# Patient Record
Sex: FEMALE | Race: WHITE | Employment: UNEMPLOYED | URBAN - METROPOLITAN AREA
[De-identification: names, ages, dates, MRNs, and addresses within clinical notes are randomized per-mention and may not be internally consistent; named-entity substitution may affect disease eponyms.]

---
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
## 2023-11-20 ENCOUNTER — OFFICE VISIT (OUTPATIENT)
Dept: FAMILY MEDICINE CLINIC | Age: 18
End: 2023-11-20
Payer: COMMERCIAL

## 2023-11-20 VITALS
DIASTOLIC BLOOD PRESSURE: 70 MMHG | RESPIRATION RATE: 16 BRPM | SYSTOLIC BLOOD PRESSURE: 130 MMHG | HEART RATE: 100 BPM | WEIGHT: 293 LBS | BODY MASS INDEX: 50.02 KG/M2 | HEIGHT: 64 IN

## 2023-11-20 DIAGNOSIS — Z00.00 WELL ADULT EXAM: Primary | ICD-10-CM

## 2023-11-20 DIAGNOSIS — F33.1 MODERATE EPISODE OF RECURRENT MAJOR DEPRESSIVE DISORDER (HCC): ICD-10-CM

## 2023-11-20 PROCEDURE — G8484 FLU IMMUNIZE NO ADMIN: HCPCS | Performed by: NURSE PRACTITIONER

## 2023-11-20 PROCEDURE — 99395 PREV VISIT EST AGE 18-39: CPT | Performed by: NURSE PRACTITIONER

## 2023-11-20 RX ORDER — DULOXETIN HYDROCHLORIDE 30 MG/1
30 CAPSULE, DELAYED RELEASE ORAL DAILY
Qty: 30 CAPSULE | Refills: 1 | Status: SHIPPED | OUTPATIENT
Start: 2023-11-20

## 2023-11-20 SDOH — ECONOMIC STABILITY: HOUSING INSECURITY
IN THE LAST 12 MONTHS, WAS THERE A TIME WHEN YOU DID NOT HAVE A STEADY PLACE TO SLEEP OR SLEPT IN A SHELTER (INCLUDING NOW)?: NO

## 2023-11-20 SDOH — ECONOMIC STABILITY: FOOD INSECURITY: WITHIN THE PAST 12 MONTHS, YOU WORRIED THAT YOUR FOOD WOULD RUN OUT BEFORE YOU GOT MONEY TO BUY MORE.: NEVER TRUE

## 2023-11-20 SDOH — ECONOMIC STABILITY: INCOME INSECURITY: HOW HARD IS IT FOR YOU TO PAY FOR THE VERY BASICS LIKE FOOD, HOUSING, MEDICAL CARE, AND HEATING?: NOT HARD AT ALL

## 2023-11-20 SDOH — ECONOMIC STABILITY: FOOD INSECURITY: WITHIN THE PAST 12 MONTHS, THE FOOD YOU BOUGHT JUST DIDN'T LAST AND YOU DIDN'T HAVE MONEY TO GET MORE.: NEVER TRUE

## 2023-11-20 ASSESSMENT — COLUMBIA-SUICIDE SEVERITY RATING SCALE - C-SSRS
6. HAVE YOU EVER DONE ANYTHING, STARTED TO DO ANYTHING, OR PREPARED TO DO ANYTHING TO END YOUR LIFE?: NO
5. HAVE YOU STARTED TO WORK OUT OR WORKED OUT THE DETAILS OF HOW TO KILL YOURSELF? DO YOU INTEND TO CARRY OUT THIS PLAN?: NO
4. HAVE YOU HAD THESE THOUGHTS AND HAD SOME INTENTION OF ACTING ON THEM?: YES
1. WITHIN THE PAST MONTH, HAVE YOU WISHED YOU WERE DEAD OR WISHED YOU COULD GO TO SLEEP AND NOT WAKE UP?: YES
2. HAVE YOU ACTUALLY HAD ANY THOUGHTS OF KILLING YOURSELF?: YES
3. HAVE YOU BEEN THINKING ABOUT HOW YOU MIGHT KILL YOURSELF?: NO

## 2023-11-20 ASSESSMENT — PATIENT HEALTH QUESTIONNAIRE - PHQ9
6. FEELING BAD ABOUT YOURSELF - OR THAT YOU ARE A FAILURE OR HAVE LET YOURSELF OR YOUR FAMILY DOWN: 3
7. TROUBLE CONCENTRATING ON THINGS, SUCH AS READING THE NEWSPAPER OR WATCHING TELEVISION: 3
5. POOR APPETITE OR OVEREATING: 1
2. FEELING DOWN, DEPRESSED OR HOPELESS: 3
SUM OF ALL RESPONSES TO PHQ QUESTIONS 1-9: 23
3. TROUBLE FALLING OR STAYING ASLEEP: 3
SUM OF ALL RESPONSES TO PHQ9 QUESTIONS 1 & 2: 6
SUM OF ALL RESPONSES TO PHQ QUESTIONS 1-9: 22
9. THOUGHTS THAT YOU WOULD BE BETTER OFF DEAD, OR OF HURTING YOURSELF: 1
SUM OF ALL RESPONSES TO PHQ QUESTIONS 1-9: 23
1. LITTLE INTEREST OR PLEASURE IN DOING THINGS: 3
SUM OF ALL RESPONSES TO PHQ QUESTIONS 1-9: 23
8. MOVING OR SPEAKING SO SLOWLY THAT OTHER PEOPLE COULD HAVE NOTICED. OR THE OPPOSITE, BEING SO FIGETY OR RESTLESS THAT YOU HAVE BEEN MOVING AROUND A LOT MORE THAN USUAL: 3
10. IF YOU CHECKED OFF ANY PROBLEMS, HOW DIFFICULT HAVE THESE PROBLEMS MADE IT FOR YOU TO DO YOUR WORK, TAKE CARE OF THINGS AT HOME, OR GET ALONG WITH OTHER PEOPLE: 2
4. FEELING TIRED OR HAVING LITTLE ENERGY: 3

## 2023-11-20 ASSESSMENT — ENCOUNTER SYMPTOMS
NAUSEA: 0
ABDOMINAL PAIN: 0
SHORTNESS OF BREATH: 0
COUGH: 0

## 2023-11-20 NOTE — PATIENT INSTRUCTIONS
[facility] Inpatient Nephrology Consult Note    Reason for Consult:  MORELIA on HD  Requesting Physician:  Dr. Morrell Host    History of Present Ilness:    50 y.o. female who  has a past medical history of Anticoagulant long-term use, Arthritis, Asthma, Baker's cyst, Depression, Factor V Leiden ([address] Factor V Leiden ([address] H/O blood clots, Heartburn, Hyperlipidemia, MRSA bacteremia (09/10/2020), and Osteoarthritis. who presented for PEG tube dislodgement. Patient had DIC with cardiogenic shock in September [facility] malnutrition s/p PEG tube placement per GI. Patient presented to ED for PEG tube dislodgement and was replaced by GI. The patient had an MORELIA and is on HD MWF at [facility] DR ANJEL PATEL, still monitoring for recovery. We are consulted for dialysis dependent MORELIA. Labs were stable, otherwise no reported fever/chills/N/V/D, no issues with HD.       Subjective:  More alert today Able to tell me she is in [place]  Agitated overnight  No family at bedside             Past Medical History:   Diagnosis Date    Anticoagulant long-term use     Arthritis     Asthma     Baker's cyst     Depression     Factor V Leiden ([address]     Factor V Leiden ([address]     H/O blood clots     Heartburn     Hyperlipidemia     MRSA bacteremia 09/10/2020    Osteoarthritis              Past Surgical History:   Procedure Laterality Date    GASTROSTOMY TUBE PLACEMENT N/A 9/25/2020    ESOPHAGOGASTRODUODENOSCOPY PERCUTANEOUS ENDOSCOPIC GASTROSTOMY TUBE PLACEMENT performed by Antwan Wilde MD at [facility] N/A 10/9/2020    ESOPHAGOGASTRODUODENOSCOPY PERCUTANEOUS ENDOCOPIC GASTROSTOMY TUBE PLACEMENT performed by Vu Cedeno DO at [facility]  IR TUNNELED CATHETER PLACEMENT GREATER THAN 5 YEARS  9/29/2020    IR TUNNELED CATHETER PLACEMENT GREATER THAN 5 YEARS 9/29/2020 [facility]    TUNNELED VENOUS CATHETER PLACEMENT Right 09/29/2020 You may receive a survey regarding the care you received during your visit. Your input is valuable to us. We encourage you to complete and return your survey. We hope you will choose us in the future for your healthcare needs. Michelet ; RIJ; 19cm; Dr. Leigh Ann Anaya        Allergies:  Ibuprofen      Social History     Occupational History    Not on file   Tobacco Use    Smoking status: Former Smoker     Packs/day: 0.50     Types: Cigarettes     Last attempt to quit: 2020     Years since quittin.1    Smokeless tobacco: Never Used   Substance and Sexual Activity    Alcohol use: No     Alcohol/week: 0.0 standard drinks    Drug use: Yes     Frequency: 14.0 times per week     Types: Marijuana    Sexual activity: Not on file          Family History   Problem Relation Age of Onset    Heart Attack Father          Physical exam:     Vitals:    10/16/20 0816   BP: 133/74   Pulse: 99   Resp: 16   Temp: 98.3 °F (36.8 °C)   SpO2: 93%    Temp (24hrs), Av.1 °F (36.7 °C), Min:97.6 °F (36.4 °C), Max:98.5 °F (36.9 °C)   & BP  Min: 111/78  Max: 144/83 Pulse  Av.8  Min: 80  Max: 101    24HR INTAKE/OUTPUT:      Intake/Output Summary (Last 24 hours) at 10/16/2020 1131  Last data filed at 10/15/2020 1757  Gross per 24 hour   Intake 286 ml   Output 30 ml   Net 256 ml         Gen: Resting in bed, somnolent , answering simple questions  Head: NC , AT  Neck: supple, trachea in midline, no overt thyromegaly noted  CV: S1, S2 heard ,no peripheral edema, no JVD    Lungs: no increased WOB, no abdominal breathing pattern, +b/l air entry, no crackles  Abd: Soft, not tender , not distended , no guarding PEG in place   Skin: no rash/lesions on exposed skin of head and neck   R TDC in place    Database  Lab Results   Component Value Date    WBC 12.1 (H) 10/15/2020    HGB 8.6 (L) 10/15/2020    HCT 27.0 (L) 10/15/2020    MCV 86.0 10/15/2020     10/15/2020           No results found for: IRON, TIBC, FERRITIN    Lab Results   Component Value Date     10/16/2020    K 4.0 10/16/2020    K 4.3 10/08/2020    CL 99 10/16/2020    CO2 29 10/16/2020    BUN 27 10/16/2020    CREATININE 3.4 10/16/2020    GLUCOSE 112 10/16/2020    CALCIUM 10.2 10/16/2020 Lab Results   Component Value Date    PTH <1.2 (L) 10/02/2020    CALCIUM 10.2 10/16/2020    CAION 1.11 (L) 09/15/2020    PHOS 4.8 10/04/2020       Lab Results   Component Value Date    LABALBU 3.2 (L) 10/04/2020       Labs Renal Latest Ref Rng & Units 10/16/2020 10/15/2020 10/14/2020 10/13/2020 10/12/2020   BUN 7 - 20 mg/dL 27(H) 14 26(H) 18 24(H)   Cr 0.6 - 1.1 mg/dL 3.4(H) 2. 7(H) 3. 0(H) 2. 5(H) 3. 1(H)   K 3.5 - 5.1 mmol/L 4.0 3.9 3.8 4.2 3.9   Na 136 - 145 mmol/L 139 139 140 135(L) 136       Lab Results   Component Value Date    MG 2.10 10/04/2020       UA   Lab Results   Component Value Date    SPECGRAV 1.025 10/15/2020    BLOODU SMALL 10/15/2020    LEUKOCYTESUR MODERATE 10/15/2020    WBCUA 10-20 10/15/2020    RBCUA 3-4 10/15/2020       RADIOLOGY     Lab Results   Component Value Date    LVEF 33 09/11/2020       Lab Results   Component Value Date    CREATININE 3.4 (H) 10/16/2020    CREATININE 2.7 (H) 10/15/2020    CREATININE 3.0 (H) 10/14/2020    CREATININE 2.5 (H) 10/13/2020    CREATININE 3.1 (H) 10/12/2020       Wt Readings from Last 3 Encounters:   10/16/20 181 lb 14.1 oz (82.5 kg)   10/04/20 188 lb 0.8 oz (85.3 kg)   12/17/18 229 lb (103.9 kg)       BP Readings from Last 3 Encounters:   10/16/20 133/74   10/09/20 (!) 141/69   10/04/20 139/71         Lab Results   Component Value Date    CALCIUM 10.2 10/16/2020    PHOS 4.8 10/04/2020         Lab Results   Component Value Date    LABALBU 3.2 (L) 10/04/2020         Scheduled Meds:   warfarin  3.75 mg Oral Once    cefTRIAXone (ROCEPHIN) IV  1 g Intravenous Q24H    famotidine (PEPCID) injection  20 mg Intravenous Daily    carvedilol  12.5 mg Oral BID WC    levETIRAcetam  1,000 mg Oral BID    diazePAM  5 mg Oral Q12H    sevelamer  0.8 g Per G Tube Daily    sodium chloride flush  10 mL Intravenous 2 times per day    amiodarone  200 mg Per NG tube BID    atorvastatin  10 mg Oral Daily    b complex-C-folic acid  1 capsule Oral Daily    lacosamide  200 mg Oral BID    warfarin (COUMADIN) daily dosing (placeholder)   Other RX Placeholder     Continuous Infusions:    PRN Meds:. Loperamide HCl, heparin (porcine), albumin human, diphenhydrAMINE, sodium chloride flush, acetaminophen **OR** acetaminophen, promethazine **OR** ondansetron, albuterol sulfate HFA        Assessment & Plan:    - Dialysis dependent MORELIA  - likely secondary to ischemia ATN. She had additional exposure to radiocontrast              - ZENON is neg, ANCA neg, C3 is low at 54  - Initiated on IHD on 9/11/20. Transitioned to CRRT on 9/12/20 which was stopped 9/15/20.   Restarted HD 09/17/20  - outpatient dialysis at [facility] MWF @ 5 PM  - [facility] [place] by [facility] placed by IR 09/29/20     PLAN:  HD today  Monitoring for possible recovery still - no signs of recovery so far      - hx of PEA arrest 9/11/20    - Essential HTN: controlled     - encephalopathy:  confused off and on, more responsive today    - Hx of Factor V leiden deficiency with PE/coagulopathy/DIC:   On coumadin    - hyperphosphatemia : on sevelamer    - anemia :  On CHAVEZ     Nutrition Tolerating LEILA Max

## 2023-11-20 NOTE — PROGRESS NOTES
Forgot to give pt lab orders at her appt today. Called pt, she will go to the [address] [place] [address] lab and have them pull orders from [address] [place] [address] 15. Pt is aware 12hr fasting is required.
for dizziness, light-headedness and headaches. Psychiatric/Behavioral:  Positive for decreased concentration, dysphoric mood and sleep disturbance. Objective:   Physical Exam  Constitutional:       General: She is not in acute distress. Eyes:      Pupils: Pupils are equal, round, and reactive to light. Cardiovascular:      Rate and Rhythm: Normal rate and regular rhythm. Heart sounds: No murmur heard. Pulmonary:      Effort: Pulmonary effort is normal.      Breath sounds: Normal breath sounds. No wheezing. Abdominal:      General: Bowel sounds are normal. There is no distension. Palpations: Abdomen is soft. Tenderness: There is no abdominal tenderness. Musculoskeletal:         General: No tenderness. Normal range of motion. Cervical back: Normal range of motion and neck supple. Skin:     General: Skin is warm and dry. Findings: No rash. Psychiatric:         Mood and Affect: Mood is depressed. Behavior: Behavior is slowed and withdrawn. Assessment:       Diagnosis Orders   1. Well adult exam  CBC    Lipid Panel    Comprehensive Metabolic Panel    Hemoglobin A1C    TSH with Reflex      2.  Moderate episode of recurrent major depressive disorder (HCC)  DULoxetine (CYMBALTA) 30 MG extended release capsule              Plan:      Screening Labs  Counseling discussed  Cymbalta 30 mg Daily  NON-pharm tx discussed  RTO in 1 month    Current Outpatient Medications   Medication Sig Dispense Refill    DULoxetine (CYMBALTA) 30 MG extended release capsule Take 1 capsule by mouth daily 30 capsule 1    albuterol sulfate  (90 Base) MCG/ACT inhaler Inhale 2 puffs into the lungs every 6 hours as needed for Wheezing or Shortness of Breath Rescue inhaler 1 each 1    naproxen (NAPROSYN) 500 MG tablet Take 1 tablet by mouth 2 times daily for 5 days 10 tablet 0    SUMAtriptan (IMITREX) 50 MG tablet Take 1 tablet by mouth once as needed for Migraine Repeat 1 tab in 2

## 2023-11-21 ENCOUNTER — NURSE ONLY (OUTPATIENT)
Dept: LAB | Age: 18
End: 2023-11-21

## 2023-11-21 DIAGNOSIS — Z00.00 WELL ADULT EXAM: ICD-10-CM

## 2023-11-21 LAB
ALBUMIN SERPL BCG-MCNC: 4 G/DL (ref 3.5–5.1)
ALP SERPL-CCNC: 52 U/L (ref 38–126)
ALT SERPL W/O P-5'-P-CCNC: 13 U/L (ref 11–66)
ANION GAP SERPL CALC-SCNC: 12 MEQ/L (ref 8–16)
AST SERPL-CCNC: 13 U/L (ref 5–40)
BILIRUB SERPL-MCNC: 0.3 MG/DL (ref 0.3–1.2)
BUN SERPL-MCNC: 9 MG/DL (ref 7–22)
CALCIUM SERPL-MCNC: 9.1 MG/DL (ref 8.5–10.5)
CHLORIDE SERPL-SCNC: 104 MEQ/L (ref 98–111)
CHOLEST SERPL-MCNC: 132 MG/DL (ref 100–199)
CO2 SERPL-SCNC: 24 MEQ/L (ref 23–33)
CREAT SERPL-MCNC: 0.6 MG/DL (ref 0.4–1.2)
DEPRECATED MEAN GLUCOSE BLD GHB EST-ACNC: 102 MG/DL (ref 70–126)
DEPRECATED RDW RBC AUTO: 45.1 FL (ref 35–45)
ERYTHROCYTE [DISTWIDTH] IN BLOOD BY AUTOMATED COUNT: 14.3 % (ref 11.5–14.5)
GFR SERPL CREATININE-BSD FRML MDRD: > 60 ML/MIN/1.73M2
GLUCOSE SERPL-MCNC: 89 MG/DL (ref 70–108)
HBA1C MFR BLD HPLC: 5.4 % (ref 4.4–6.4)
HCT VFR BLD AUTO: 39.1 % (ref 37–47)
HDLC SERPL-MCNC: 47 MG/DL
HGB BLD-MCNC: 12.2 GM/DL (ref 12–16)
LDLC SERPL CALC-MCNC: 73 MG/DL
MCH RBC QN AUTO: 27.1 PG (ref 26–33)
MCHC RBC AUTO-ENTMCNC: 31.2 GM/DL (ref 32.2–35.5)
MCV RBC AUTO: 86.9 FL (ref 81–99)
PLATELET # BLD AUTO: 286 THOU/MM3 (ref 130–400)
PMV BLD AUTO: 11 FL (ref 9.4–12.4)
POTASSIUM SERPL-SCNC: 4.2 MEQ/L (ref 3.5–5.2)
PROT SERPL-MCNC: 6.9 G/DL (ref 6.1–8)
RBC # BLD AUTO: 4.5 MILL/MM3 (ref 4.2–5.4)
SODIUM SERPL-SCNC: 140 MEQ/L (ref 135–145)
TRIGL SERPL-MCNC: 59 MG/DL (ref 0–199)
TSH SERPL DL<=0.005 MIU/L-ACNC: 2.25 UIU/ML (ref 0.4–4.2)
WBC # BLD AUTO: 7.7 THOU/MM3 (ref 4.8–10.8)

## 2024-01-22 ENCOUNTER — OFFICE VISIT (OUTPATIENT)
Dept: FAMILY MEDICINE CLINIC | Age: 19
End: 2024-01-22
Payer: COMMERCIAL

## 2024-01-22 VITALS
BODY MASS INDEX: 53.25 KG/M2 | RESPIRATION RATE: 16 BRPM | HEART RATE: 76 BPM | DIASTOLIC BLOOD PRESSURE: 64 MMHG | SYSTOLIC BLOOD PRESSURE: 102 MMHG | WEIGHT: 293 LBS

## 2024-01-22 DIAGNOSIS — F33.1 MODERATE EPISODE OF RECURRENT MAJOR DEPRESSIVE DISORDER (HCC): Primary | ICD-10-CM

## 2024-01-22 DIAGNOSIS — R06.02 SOB (SHORTNESS OF BREATH): ICD-10-CM

## 2024-01-22 PROCEDURE — 1036F TOBACCO NON-USER: CPT | Performed by: NURSE PRACTITIONER

## 2024-01-22 PROCEDURE — G8484 FLU IMMUNIZE NO ADMIN: HCPCS | Performed by: NURSE PRACTITIONER

## 2024-01-22 PROCEDURE — 99213 OFFICE O/P EST LOW 20 MIN: CPT | Performed by: NURSE PRACTITIONER

## 2024-01-22 PROCEDURE — G8427 DOCREV CUR MEDS BY ELIG CLIN: HCPCS | Performed by: NURSE PRACTITIONER

## 2024-01-22 PROCEDURE — G8417 CALC BMI ABV UP PARAM F/U: HCPCS | Performed by: NURSE PRACTITIONER

## 2024-01-22 RX ORDER — ALBUTEROL SULFATE 90 UG/1
2 AEROSOL, METERED RESPIRATORY (INHALATION) EVERY 6 HOURS PRN
Qty: 1 EACH | Refills: 1 | Status: SHIPPED | OUTPATIENT
Start: 2024-01-22

## 2024-01-22 RX ORDER — DULOXETIN HYDROCHLORIDE 60 MG/1
60 CAPSULE, DELAYED RELEASE ORAL DAILY
Qty: 90 CAPSULE | Refills: 3 | Status: SHIPPED | OUTPATIENT
Start: 2024-01-22

## 2024-01-22 RX ORDER — BUPROPION HYDROCHLORIDE 300 MG/1
300 TABLET ORAL EVERY MORNING
Qty: 90 TABLET | Refills: 0 | Status: SHIPPED | OUTPATIENT
Start: 2024-01-22

## 2024-01-22 ASSESSMENT — ENCOUNTER SYMPTOMS
SHORTNESS OF BREATH: 0
COUGH: 0
ABDOMINAL PAIN: 0
NAUSEA: 0

## 2024-01-22 ASSESSMENT — PATIENT HEALTH QUESTIONNAIRE - PHQ9
1. LITTLE INTEREST OR PLEASURE IN DOING THINGS: 2
6. FEELING BAD ABOUT YOURSELF - OR THAT YOU ARE A FAILURE OR HAVE LET YOURSELF OR YOUR FAMILY DOWN: 2
2. FEELING DOWN, DEPRESSED OR HOPELESS: 1
8. MOVING OR SPEAKING SO SLOWLY THAT OTHER PEOPLE COULD HAVE NOTICED. OR THE OPPOSITE, BEING SO FIGETY OR RESTLESS THAT YOU HAVE BEEN MOVING AROUND A LOT MORE THAN USUAL: 2
SUM OF ALL RESPONSES TO PHQ QUESTIONS 1-9: 14
4. FEELING TIRED OR HAVING LITTLE ENERGY: 1
7. TROUBLE CONCENTRATING ON THINGS, SUCH AS READING THE NEWSPAPER OR WATCHING TELEVISION: 2
10. IF YOU CHECKED OFF ANY PROBLEMS, HOW DIFFICULT HAVE THESE PROBLEMS MADE IT FOR YOU TO DO YOUR WORK, TAKE CARE OF THINGS AT HOME, OR GET ALONG WITH OTHER PEOPLE: 0
SUM OF ALL RESPONSES TO PHQ QUESTIONS 1-9: 14
SUM OF ALL RESPONSES TO PHQ QUESTIONS 1-9: 14
SUM OF ALL RESPONSES TO PHQ9 QUESTIONS 1 & 2: 3
9. THOUGHTS THAT YOU WOULD BE BETTER OFF DEAD, OR OF HURTING YOURSELF: 0
3. TROUBLE FALLING OR STAYING ASLEEP: 2
SUM OF ALL RESPONSES TO PHQ QUESTIONS 1-9: 14
5. POOR APPETITE OR OVEREATING: 2

## 2024-01-22 NOTE — PROGRESS NOTES
Subjective:      Patient ID: Suki Sevilla 2005 is a 18 y.o. female here for evaluation.     Chief Complaint   Patient presents with    1 Month Follow-Up    Depression     Improved mood since last visit       Last visit 1 month ago increase on Cymbalta 60 mg and addition of Wellbutrin 150 mg XL.  Tolerating it well.   Benefit seen.  Improved mood.   Motivation has improved the most.   Running a 5K in February 2024 in [place]    Last couple of years with depression.   Has continued and worsening.  Support is off and on.  Concentration affected.     Working FT At [employer]    Sleep affected  Interest decline  Guilty  Energy decline  Contraction  Appetite change  Psychomotor Retardation    Hx of suicidal thought.  Nothing recent.    Patient Active Problem List   Diagnosis    Enlarged tonsils    Chronic serous otitis media    Childhood obesity, BMI  percentile    Fever in pediatric patient    Headache    Generalized abdominal pain    Hyperinsulinemia    Sprain of right ankle       Vitals:    01/22/24 1319   BP: 102/64   Pulse: 76   Resp: 16        Labs reviewed    Lab Results   Component Value Date    LABA1C 5.4 11/21/2023    LABA1C 5.7 07/12/2016     No results found for: \"EAG\"    No components found for: \"CHLPL\"  Lab Results   Component Value Date    TRIG 59 11/21/2023     Lab Results   Component Value Date    HDL 47 11/21/2023     Lab Results   Component Value Date    LDLCALC 73 11/21/2023     No results found for: \"LABVLDL\"      Chemistry        Component Value Date/Time     11/21/2023 0947    K 4.2 11/21/2023 0947     11/21/2023 0947    CO2 24 11/21/2023 0947    BUN 9 11/21/2023 0947    CREATININE 0.6 11/21/2023 0947        Component Value Date/Time    CALCIUM 9.1 11/21/2023 0947    ALKPHOS 52 11/21/2023 0947    AST 13 11/21/2023 0947    ALT 13 11/21/2023 0947    BILITOT 0.3 11/21/2023 0947            Lab Results   Component Value Date    TSH 2.250 11/21/2023       Lab Results   Component

## 2024-02-09 ENCOUNTER — OFFICE VISIT (OUTPATIENT)
Dept: FAMILY MEDICINE CLINIC | Age: 19
End: 2024-02-09
Payer: COMMERCIAL

## 2024-02-09 VITALS
WEIGHT: 293 LBS | HEIGHT: 64 IN | BODY MASS INDEX: 50.02 KG/M2 | DIASTOLIC BLOOD PRESSURE: 74 MMHG | OXYGEN SATURATION: 99 % | RESPIRATION RATE: 16 BRPM | HEART RATE: 85 BPM | SYSTOLIC BLOOD PRESSURE: 122 MMHG

## 2024-02-09 DIAGNOSIS — S86.811A STRAIN OF RIGHT CALF MUSCLE: Primary | ICD-10-CM

## 2024-02-09 PROCEDURE — 99213 OFFICE O/P EST LOW 20 MIN: CPT | Performed by: NURSE PRACTITIONER

## 2024-02-09 PROCEDURE — G8427 DOCREV CUR MEDS BY ELIG CLIN: HCPCS | Performed by: NURSE PRACTITIONER

## 2024-02-09 PROCEDURE — G8484 FLU IMMUNIZE NO ADMIN: HCPCS | Performed by: NURSE PRACTITIONER

## 2024-02-09 PROCEDURE — G8417 CALC BMI ABV UP PARAM F/U: HCPCS | Performed by: NURSE PRACTITIONER

## 2024-02-09 PROCEDURE — 1036F TOBACCO NON-USER: CPT | Performed by: NURSE PRACTITIONER

## 2024-02-09 RX ORDER — NAPROXEN 500 MG/1
500 TABLET ORAL 2 TIMES DAILY PRN
Qty: 30 TABLET | Refills: 0 | Status: SHIPPED | OUTPATIENT
Start: 2024-02-09

## 2024-02-09 ASSESSMENT — ENCOUNTER SYMPTOMS: RESPIRATORY NEGATIVE: 1

## 2024-04-26 ENCOUNTER — OFFICE VISIT (OUTPATIENT)
Dept: FAMILY MEDICINE CLINIC | Age: 19
End: 2024-04-26
Payer: COMMERCIAL

## 2024-04-26 VITALS
DIASTOLIC BLOOD PRESSURE: 76 MMHG | RESPIRATION RATE: 16 BRPM | WEIGHT: 293 LBS | SYSTOLIC BLOOD PRESSURE: 124 MMHG | BODY MASS INDEX: 52.68 KG/M2 | HEART RATE: 80 BPM

## 2024-04-26 DIAGNOSIS — G44.52 NEW DAILY PERSISTENT HEADACHE: ICD-10-CM

## 2024-04-26 DIAGNOSIS — F33.1 MODERATE EPISODE OF RECURRENT MAJOR DEPRESSIVE DISORDER (HCC): Primary | ICD-10-CM

## 2024-04-26 DIAGNOSIS — H53.9 VISUAL CHANGES: ICD-10-CM

## 2024-04-26 DIAGNOSIS — H57.02 PUPILLARY SIZE INEQUALITY: ICD-10-CM

## 2024-04-26 PROCEDURE — 1036F TOBACCO NON-USER: CPT | Performed by: NURSE PRACTITIONER

## 2024-04-26 PROCEDURE — G8427 DOCREV CUR MEDS BY ELIG CLIN: HCPCS | Performed by: NURSE PRACTITIONER

## 2024-04-26 PROCEDURE — G8417 CALC BMI ABV UP PARAM F/U: HCPCS | Performed by: NURSE PRACTITIONER

## 2024-04-26 PROCEDURE — 99214 OFFICE O/P EST MOD 30 MIN: CPT | Performed by: NURSE PRACTITIONER

## 2024-04-26 PROCEDURE — G2211 COMPLEX E/M VISIT ADD ON: HCPCS | Performed by: NURSE PRACTITIONER

## 2024-04-26 RX ORDER — NAPROXEN 500 MG/1
500 TABLET ORAL 2 TIMES DAILY PRN
Qty: 28 TABLET | Refills: 0 | Status: SHIPPED | OUTPATIENT
Start: 2024-04-26 | End: 2024-05-10

## 2024-04-26 RX ORDER — AMITRIPTYLINE HYDROCHLORIDE 25 MG/1
25 TABLET, FILM COATED ORAL NIGHTLY
Qty: 14 TABLET | Refills: 0 | Status: SHIPPED | OUTPATIENT
Start: 2024-04-26 | End: 2024-05-10

## 2024-04-26 RX ORDER — BUPROPION HYDROCHLORIDE 300 MG/1
300 TABLET ORAL EVERY MORNING
Qty: 90 TABLET | Refills: 3 | Status: SHIPPED | OUTPATIENT
Start: 2024-04-26

## 2024-04-26 RX ORDER — DULOXETIN HYDROCHLORIDE 60 MG/1
60 CAPSULE, DELAYED RELEASE ORAL DAILY
Qty: 90 CAPSULE | Refills: 3 | Status: SHIPPED | OUTPATIENT
Start: 2024-04-26

## 2024-04-26 ASSESSMENT — ENCOUNTER SYMPTOMS
ABDOMINAL PAIN: 0
SHORTNESS OF BREATH: 0
NAUSEA: 0
COUGH: 0

## 2024-04-26 NOTE — PROGRESS NOTES
Subjective:      Patient ID: Suki Sevilla 2005 is a 18 y.o. female here for evaluation.     Chief Complaint   Patient presents with    3 Month Follow-Up     Mood improved and stable    Headache     1-2x daily for the past 1 or 2 months.  Tylenol or Ibuprofen with mild relief       On Cymbalta 60 mg and Wellbutrin 300 mg XL.  Tolerating it well.   Benefit seen.  Improved mood.   Motivation has improved the most.   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  Working FT at [employer] and going to School PT.     Daily HA.  Onset of 2 months.  NKI.   1-2 times daily.  Worse as the day goes on.  Taking Tylenol and IBU everyday.  HA are intense with right visual changes.   Concentration and confusion episodes.  Dazed at times.  Denies worse HA of life or waking up from sleep.     Body mass index is 52.68 kg/m².    Patient Active Problem List   Diagnosis    Enlarged tonsils    Chronic serous otitis media    Childhood obesity, BMI  percentile    Fever in pediatric patient    Headache    Generalized abdominal pain    Hyperinsulinemia    Sprain of right ankle       Vitals:    04/26/24 0928   BP: 124/76   Pulse: 80   Resp: 16        Labs reviewed    Lab Results   Component Value Date    LABA1C 5.4 11/21/2023    LABA1C 5.7 07/12/2016     Lab Results   Component Value Date     11/21/2023       No components found for: \"CHLPL\"  Lab Results   Component Value Date    TRIG 59 11/21/2023     Lab Results   Component Value Date    HDL 47 11/21/2023     Lab Results   Component Value Date    LDLCALC 73 11/21/2023     No components found for: \"LABVLDL\"      Chemistry        Component Value Date/Time     11/21/2023 0947    K 4.2 11/21/2023 0947     11/21/2023 0947    CO2 24 11/21/2023 0947    BUN 9 11/21/2023 0947    CREATININE 0.6 11/21/2023 0947        Component Value Date/Time    CALCIUM 9.1 11/21/2023 0947    ALKPHOS 52 11/21/2023 0947    AST 13 11/21/2023 0947    ALT 13 11/21/2023 0947    BILITOT 0.3

## 2024-05-30 ENCOUNTER — HOSPITAL ENCOUNTER (OUTPATIENT)
Dept: MRI IMAGING | Age: 19
Discharge: HOME | End: 2024-05-30
Payer: COMMERCIAL

## 2024-05-30 DIAGNOSIS — G44.52 NEW DAILY PERSISTENT HEADACHE: ICD-10-CM

## 2024-05-30 DIAGNOSIS — H57.02 PUPILLARY SIZE INEQUALITY: ICD-10-CM

## 2024-05-30 DIAGNOSIS — H53.9 VISUAL CHANGES: ICD-10-CM

## 2024-05-30 PROCEDURE — 6360000004 HC RX CONTRAST MEDICATION: Performed by: NURSE PRACTITIONER

## 2024-05-30 PROCEDURE — 70553 MRI BRAIN STEM W/O & W/DYE: CPT

## 2024-05-30 PROCEDURE — A9579 GAD-BASE MR CONTRAST NOS,1ML: HCPCS | Performed by: NURSE PRACTITIONER

## 2024-05-30 RX ADMIN — GADOTERIDOL 20 ML: 279.3 INJECTION, SOLUTION INTRAVENOUS at 20:50

## 2024-05-31 DIAGNOSIS — R09.89 SINUS COMPLAINT: ICD-10-CM

## 2024-05-31 DIAGNOSIS — J34.1 MUCOUS RETENTION CYST OF MAXILLARY SINUS: Primary | ICD-10-CM

## 2024-07-19 ENCOUNTER — HOSPITAL ENCOUNTER (OUTPATIENT)
Dept: GENERAL RADIOLOGY | Age: 19
Discharge: HOME | End: 2024-07-19
Payer: COMMERCIAL

## 2024-07-19 ENCOUNTER — HOSPITAL ENCOUNTER (OUTPATIENT)
Age: 19
Discharge: HOME | End: 2024-07-19
Payer: COMMERCIAL

## 2024-07-19 ENCOUNTER — PATIENT MESSAGE (OUTPATIENT)
Dept: FAMILY MEDICINE CLINIC | Age: 19
End: 2024-07-19

## 2024-07-19 DIAGNOSIS — M25.472 PAIN AND SWELLING OF LEFT ANKLE: Primary | ICD-10-CM

## 2024-07-19 DIAGNOSIS — M25.472 PAIN AND SWELLING OF LEFT ANKLE: ICD-10-CM

## 2024-07-19 DIAGNOSIS — M25.572 PAIN AND SWELLING OF LEFT ANKLE: ICD-10-CM

## 2024-07-19 DIAGNOSIS — M25.572 PAIN AND SWELLING OF LEFT ANKLE: Primary | ICD-10-CM

## 2024-07-19 PROCEDURE — 73610 X-RAY EXAM OF ANKLE: CPT

## 2024-07-19 NOTE — TELEPHONE ENCOUNTER
From: Suki Sevilla  To: Jose Chavez  Sent: 7/19/2024 7:09 AM EDT  Subject: Left ankle injury    I injured my left ankle on Monday and through out the week it has gotten worse with swelling, a little bruising and hurts to put pressure on. I was wanting to see if I can get an X-ray order send it for it.

## 2025-03-10 ENCOUNTER — TELEMEDICINE (OUTPATIENT)
Dept: FAMILY MEDICINE CLINIC | Age: 20
End: 2025-03-10
Payer: COMMERCIAL

## 2025-03-10 DIAGNOSIS — R25.1 SHAKINESS: Primary | ICD-10-CM

## 2025-03-10 PROCEDURE — G8427 DOCREV CUR MEDS BY ELIG CLIN: HCPCS | Performed by: NURSE PRACTITIONER

## 2025-03-10 PROCEDURE — 99213 OFFICE O/P EST LOW 20 MIN: CPT | Performed by: NURSE PRACTITIONER

## 2025-03-10 PROCEDURE — G2211 COMPLEX E/M VISIT ADD ON: HCPCS | Performed by: NURSE PRACTITIONER

## 2025-03-10 NOTE — PROGRESS NOTES
Subjective:      Patient ID: Suki Sevilla is a 19 y.o. female    HPI: Acute     TELEHEALTH EVALUATION -- Audio/Visual     Patient identification was verified at the start of the visit: Yes    Services were provided through a video synchronous discussion virtually to substitute for in-person clinic visit. Patient and provider were located at their individual homes.    Patient has requested an audio/video evaluation for the following concern(s):    Chief Complaint   Patient presents with    Not Feeling Well       Last 1 month will have episodes of 2 times per week of getting nausea, shakiness, HA and confusion.    Had an episode yesterday.    BS check during symptoms 88.    Will last 30 minutes to 1 hour.   Will be wiped energy wise after the fact.  No pattern to episodes.  Not associated with eating schedule.    Denies lightheadedness or syncope.     Increase stress with new job and responsibilities.     Patient Active Problem List   Diagnosis    Enlarged tonsils    Chronic serous otitis media    Childhood obesity, BMI  percentile    Fever in pediatric patient    Headache    Generalized abdominal pain    Hyperinsulinemia    Sprain of right ankle       Review of Systems   Constitutional:  Positive for fatigue. Negative for chills and fever.   HENT: Negative.     Respiratory:  Negative for cough and shortness of breath.    Cardiovascular:  Negative for chest pain.   Gastrointestinal:  Negative for abdominal pain and nausea.   Skin:  Negative for rash.   Neurological:  Positive for dizziness and weakness. Negative for light-headedness and headaches.   Psychiatric/Behavioral: Negative.         Objective:   Physical Exam  Constitutional:       General: She is not in acute distress.     Appearance: She is not ill-appearing.   Pulmonary:      Effort: Pulmonary effort is normal. No respiratory distress.   Neurological:      Mental Status: She is alert and oriented to person, place, and time.   Psychiatric:

## 2025-03-11 ENCOUNTER — LAB (OUTPATIENT)
Dept: LAB | Age: 20
End: 2025-03-11

## 2025-03-11 DIAGNOSIS — R25.1 SHAKINESS: ICD-10-CM

## 2025-03-11 LAB
ALBUMIN SERPL BCG-MCNC: 4.1 G/DL (ref 3.4–4.9)
ALP SERPL-CCNC: 55 U/L (ref 35–104)
ALT SERPL W/O P-5'-P-CCNC: 16 U/L (ref 10–35)
ANION GAP SERPL CALC-SCNC: 11 MEQ/L (ref 8–16)
AST SERPL-CCNC: 18 U/L (ref 10–35)
BASOPHILS ABSOLUTE: 0 THOU/MM3 (ref 0–0.1)
BASOPHILS NFR BLD AUTO: 0.5 %
BILIRUB SERPL-MCNC: 0.4 MG/DL (ref 0.3–1.2)
BUN SERPL-MCNC: 12 MG/DL (ref 8–23)
CALCIUM SERPL-MCNC: 9.6 MG/DL (ref 8.6–10)
CHLORIDE SERPL-SCNC: 107 MEQ/L (ref 98–111)
CO2 SERPL-SCNC: 24 MEQ/L (ref 22–29)
CREAT SERPL-MCNC: 0.7 MG/DL (ref 0.5–0.9)
DEPRECATED RDW RBC AUTO: 43.4 FL (ref 35–45)
EOSINOPHIL NFR BLD AUTO: 0.7 %
EOSINOPHILS ABSOLUTE: 0.1 THOU/MM3 (ref 0–0.4)
ERYTHROCYTE [DISTWIDTH] IN BLOOD BY AUTOMATED COUNT: 14.3 % (ref 11.5–14.5)
GFR SERPL CREATININE-BSD FRML MDRD: > 90 ML/MIN/1.73M2
GLUCOSE FASTING: 92 MG/DL (ref 74–109)
HCT VFR BLD AUTO: 40.9 % (ref 37–47)
HGB BLD-MCNC: 12.7 GM/DL (ref 12–16)
IMM GRANULOCYTES # BLD AUTO: 0.02 THOU/MM3 (ref 0–0.07)
IMM GRANULOCYTES NFR BLD AUTO: 0.2 %
INSULIN SERPL-ACNC: 59.4 MU/L
LYMPHOCYTES ABSOLUTE: 2.3 THOU/MM3 (ref 1–4.8)
LYMPHOCYTES NFR BLD AUTO: 26.6 %
MCH RBC QN AUTO: 26.1 PG (ref 26–33)
MCHC RBC AUTO-ENTMCNC: 31.1 GM/DL (ref 32.2–35.5)
MCV RBC AUTO: 84 FL (ref 81–99)
MONOCYTES ABSOLUTE: 0.6 THOU/MM3 (ref 0.4–1.3)
MONOCYTES NFR BLD AUTO: 6.9 %
NEUTROPHILS ABSOLUTE: 5.7 THOU/MM3 (ref 1.8–7.7)
NEUTROPHILS NFR BLD AUTO: 65.1 %
NRBC BLD AUTO-RTO: 0 /100 WBC
PLATELET # BLD AUTO: 347 THOU/MM3 (ref 130–400)
PMV BLD AUTO: 11.1 FL (ref 9.4–12.4)
POTASSIUM SERPL-SCNC: 4.2 MEQ/L (ref 3.5–5.2)
PROT SERPL-MCNC: 7.7 G/DL (ref 6.4–8.3)
RBC # BLD AUTO: 4.87 MILL/MM3 (ref 4.2–5.4)
SODIUM SERPL-SCNC: 142 MEQ/L (ref 135–145)
WBC # BLD AUTO: 8.8 THOU/MM3 (ref 4.8–10.8)

## 2025-03-11 ASSESSMENT — ENCOUNTER SYMPTOMS
ABDOMINAL PAIN: 0
NAUSEA: 0
COUGH: 0
SHORTNESS OF BREATH: 0

## 2025-03-12 ENCOUNTER — RESULTS FOLLOW-UP (OUTPATIENT)
Dept: FAMILY MEDICINE CLINIC | Age: 20
End: 2025-03-12